# Patient Record
Sex: MALE | URBAN - METROPOLITAN AREA
[De-identification: names, ages, dates, MRNs, and addresses within clinical notes are randomized per-mention and may not be internally consistent; named-entity substitution may affect disease eponyms.]

---

## 2023-04-05 PROBLEM — G89.29 CHRONIC LOW BACK PAIN WITH BILATERAL SCIATICA: Status: ACTIVE | Noted: 2023-04-05

## 2023-04-05 PROBLEM — Z80.0 FAMILY HX OF COLON CANCER: Status: ACTIVE | Noted: 2023-04-05

## 2023-04-05 PROBLEM — R03.0 ELEVATED BLOOD PRESSURE READING IN OFFICE WITHOUT DIAGNOSIS OF HYPERTENSION: Status: ACTIVE | Noted: 2023-04-05

## 2023-04-05 PROBLEM — Z11.59 NEED FOR HEPATITIS C SCREENING TEST: Status: ACTIVE | Noted: 2023-04-05

## 2023-04-05 PROBLEM — M54.42 CHRONIC LOW BACK PAIN WITH BILATERAL SCIATICA: Status: ACTIVE | Noted: 2023-04-05

## 2023-04-05 PROBLEM — E78.01 FAMILIAL HYPERCHOLESTEROLEMIA: Status: ACTIVE | Noted: 2023-04-05

## 2023-04-05 PROBLEM — M54.41 CHRONIC LOW BACK PAIN WITH BILATERAL SCIATICA: Status: ACTIVE | Noted: 2023-04-05

## 2023-04-05 PROBLEM — Z11.4 ENCOUNTER FOR SCREENING FOR HIV: Status: ACTIVE | Noted: 2023-04-05

## 2023-04-19 ENCOUNTER — OFFICE VISIT (OUTPATIENT)
Dept: SURGERY | Age: 48
End: 2023-04-19
Payer: COMMERCIAL

## 2023-04-19 VITALS
HEIGHT: 75 IN | DIASTOLIC BLOOD PRESSURE: 103 MMHG | SYSTOLIC BLOOD PRESSURE: 143 MMHG | HEART RATE: 76 BPM | WEIGHT: 256.2 LBS | BODY MASS INDEX: 31.85 KG/M2

## 2023-04-19 DIAGNOSIS — Z12.11 ENCOUNTER FOR SCREENING COLONOSCOPY FOR NON-HIGH-RISK PATIENT: Primary | ICD-10-CM

## 2023-04-19 PROCEDURE — S0285 CNSLT BEFORE SCREEN COLONOSC: HCPCS | Performed by: SURGERY

## 2023-04-26 ENCOUNTER — HOSPITAL ENCOUNTER (OUTPATIENT)
Dept: PHYSICAL THERAPY | Age: 48
Setting detail: THERAPIES SERIES
Discharge: HOME | End: 2023-04-26

## 2023-04-26 NOTE — FLOWSHEET NOTE
[x] [facility]  [address]    [phone]  F: [phone]          Physical Therapy Cancel/No Show note    Date: 2023  Patient: France Kapadia  : 1975  MRN: [id number]    Cancels/No Shows to date:     For today's appointment patient:    [x]  Cancelled    [x] Rescheduled appointment    [] No-show     Reason given by patient:    []  Patient ill    []  Conflicting appointment    [] No transportation      [] Conflict with work    [] No reason given    [] Weather related    [] COVID-19    [x] Other:      Comments:   Pt cancelled for initial evaluation for Physical Therapy for LBP. Pt states unable to make it, rescheduled appt to 2023.       [] Next appointment was confirmed    Electronically signed by: Richard Enriquez PT

## 2023-05-05 PROBLEM — Z11.4 ENCOUNTER FOR SCREENING FOR HIV: Status: RESOLVED | Noted: 2023-04-05 | Resolved: 2023-05-05

## 2023-05-05 PROBLEM — Z11.59 NEED FOR HEPATITIS C SCREENING TEST: Status: RESOLVED | Noted: 2023-04-05 | Resolved: 2023-05-05

## 2023-05-08 ENCOUNTER — HOSPITAL ENCOUNTER (OUTPATIENT)
Dept: PHYSICAL THERAPY | Age: 48
Setting detail: THERAPIES SERIES
Discharge: HOME | End: 2023-05-08
Payer: COMMERCIAL

## 2023-05-08 PROCEDURE — 97110 THERAPEUTIC EXERCISES: CPT

## 2023-05-08 PROCEDURE — 97161 PT EVAL LOW COMPLEX 20 MIN: CPT
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
